# Patient Record
Sex: MALE | Race: WHITE | NOT HISPANIC OR LATINO | URBAN - METROPOLITAN AREA
[De-identification: names, ages, dates, MRNs, and addresses within clinical notes are randomized per-mention and may not be internally consistent; named-entity substitution may affect disease eponyms.]

---

## 2017-01-13 ENCOUNTER — APPOINTMENT (RX ONLY)
Dept: URBAN - METROPOLITAN AREA CLINIC 343 | Facility: CLINIC | Age: 77
Setting detail: DERMATOLOGY
End: 2017-01-13

## 2017-01-13 DIAGNOSIS — L57.0 ACTINIC KERATOSIS: ICD-10-CM

## 2017-01-13 PROBLEM — C44.42 SQUAMOUS CELL CARCINOMA OF SKIN OF SCALP AND NECK: Status: ACTIVE | Noted: 2017-01-13

## 2017-01-13 PROCEDURE — ? OTHER

## 2017-01-13 PROCEDURE — ? PRESCRIPTION

## 2017-01-13 PROCEDURE — ? TREATMENT REGIMEN

## 2017-01-13 PROCEDURE — 99212 OFFICE O/P EST SF 10 MIN: CPT

## 2017-01-13 PROCEDURE — ? COUNSELING

## 2017-01-13 RX ORDER — IMIQUIMOD 37.5 MG/G
1 CREAM TOPICAL QD
Qty: 1 | Refills: 0 | Status: ERX | COMMUNITY
Start: 2017-01-13

## 2017-01-13 RX ADMIN — IMIQUIMOD 1: 37.5 CREAM TOPICAL at 00:00

## 2017-01-13 ASSESSMENT — LOCATION ZONE DERM: LOCATION ZONE: SCALP

## 2017-01-13 ASSESSMENT — LOCATION DETAILED DESCRIPTION DERM: LOCATION DETAILED: POSTERIOR MID-PARIETAL SCALP

## 2017-01-13 ASSESSMENT — LOCATION SIMPLE DESCRIPTION DERM: LOCATION SIMPLE: POSTERIOR SCALP

## 2017-01-13 NOTE — PROCEDURE: OTHER
Note Text (......Xxx Chief Complaint.): This diagnosis correlates with the
Other (Free Text): Due to size of cancer and ill defined borders it was discussed treating the lesion with Imiquimod cream.
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 Will have him treat the entire top portion of scalp into the hairline.
Detail Level: Zone
Detail Level: Detailed

## 2017-01-13 NOTE — PROCEDURE: TREATMENT REGIMEN
Plan: Will recheck in 2 weeks. Patient is aware we plan on using this cream for 4 weeks total to try and shrink the cancer and then discuss removal via surgery.
Initiate Treatment: Will send in RX Imiquimod cream, apply QD mon- friday to top of scalp, encompassing the actinic damage as well.
Detail Level: Simple

## 2017-01-27 ENCOUNTER — APPOINTMENT (RX ONLY)
Dept: URBAN - METROPOLITAN AREA CLINIC 343 | Facility: CLINIC | Age: 77
Setting detail: DERMATOLOGY
End: 2017-01-27

## 2017-01-27 DIAGNOSIS — L57.0 ACTINIC KERATOSIS: ICD-10-CM

## 2017-01-27 PROBLEM — C44.42 SQUAMOUS CELL CARCINOMA OF SKIN OF SCALP AND NECK: Status: ACTIVE | Noted: 2017-01-27

## 2017-01-27 PROCEDURE — 99213 OFFICE O/P EST LOW 20 MIN: CPT

## 2017-01-27 PROCEDURE — ? TREATMENT REGIMEN

## 2017-01-27 ASSESSMENT — LOCATION SIMPLE DESCRIPTION DERM: LOCATION SIMPLE: SCALP

## 2017-01-27 ASSESSMENT — LOCATION ZONE DERM: LOCATION ZONE: SCALP

## 2017-01-27 ASSESSMENT — LOCATION DETAILED DESCRIPTION DERM: LOCATION DETAILED: LEFT SUPERIOR PARIETAL SCALP

## 2017-01-27 NOTE — PROCEDURE: TREATMENT REGIMEN
Plan: The patient is to call the office if any complications arise for further evaluation and management. The patient is to recheck in 2 weeks
Detail Level: Simple
Continue Regimen: Imiquimod 3.75% topical cream to the top of the scalp once a day Mon. thru Fri. for 2 more weeks then discontinue (the patient states he has enough medication at home, no refill needed)
Continue Regimen: Imiquimod 3.75% topical cream once a day Mon. thru Fri. to the top of the scalp for 2 more weeks then discontinue (the patient states he has enough medication at home, no refill needed)
Plan: The patient is to call the office if any complications arise for further evaluation and management. He is to recheck the scalp in 2 weeks

## 2017-02-10 ENCOUNTER — APPOINTMENT (RX ONLY)
Dept: URBAN - METROPOLITAN AREA CLINIC 343 | Facility: CLINIC | Age: 77
Setting detail: DERMATOLOGY
End: 2017-02-10

## 2017-02-10 DIAGNOSIS — L57.0 ACTINIC KERATOSIS: ICD-10-CM

## 2017-02-10 PROBLEM — C44.42 SQUAMOUS CELL CARCINOMA OF SKIN OF SCALP AND NECK: Status: ACTIVE | Noted: 2017-02-10

## 2017-02-10 PROBLEM — Z85.828 PERSONAL HISTORY OF OTHER MALIGNANT NEOPLASM OF SKIN: Status: ACTIVE | Noted: 2017-02-10

## 2017-02-10 PROBLEM — E78.5 HYPERLIPIDEMIA, UNSPECIFIED: Status: ACTIVE | Noted: 2017-02-10

## 2017-02-10 PROCEDURE — ? TREATMENT REGIMEN

## 2017-02-10 PROCEDURE — ? PRESCRIPTION

## 2017-02-10 PROCEDURE — 99212 OFFICE O/P EST SF 10 MIN: CPT

## 2017-02-10 RX ORDER — MUPIROCIN 20 MG/G
1 OINTMENT TOPICAL BID
Qty: 1 | Refills: 2 | Status: ERX | COMMUNITY
Start: 2017-02-10

## 2017-02-10 RX ADMIN — MUPIROCIN 1: 20 OINTMENT TOPICAL at 00:00

## 2017-02-10 ASSESSMENT — LOCATION SIMPLE DESCRIPTION DERM: LOCATION SIMPLE: SCALP

## 2017-02-10 ASSESSMENT — LOCATION ZONE DERM: LOCATION ZONE: SCALP

## 2017-02-10 ASSESSMENT — LOCATION DETAILED DESCRIPTION DERM: LOCATION DETAILED: LEFT SUPERIOR PARIETAL SCALP

## 2017-02-10 NOTE — PROCEDURE: TREATMENT REGIMEN
Discontinue Regimen: Patient to discontinue the imiquimod cream at this time.
Detail Level: Simple
Discontinue Regimen: Patient did a great job treating the actinic damage in the top of his scalp. He is to discontinue the treatment at this time. Will follow up inn2 weeks.
Initiate Treatment: Will send in rx mupirocin ointment to start applying bid to top of scalp.
Plan: Patient to apply aquaphor on scalp as well. Will recheck in 2 weeks.
Detail Level: Zone

## 2017-02-23 ENCOUNTER — APPOINTMENT (RX ONLY)
Dept: URBAN - METROPOLITAN AREA CLINIC 343 | Facility: CLINIC | Age: 77
Setting detail: DERMATOLOGY
End: 2017-02-23

## 2017-02-23 PROBLEM — C44.42 SQUAMOUS CELL CARCINOMA OF SKIN OF SCALP AND NECK: Status: ACTIVE | Noted: 2017-02-23

## 2017-02-23 PROCEDURE — 99212 OFFICE O/P EST SF 10 MIN: CPT

## 2017-02-23 PROCEDURE — ? TREATMENT REGIMEN

## 2017-02-23 NOTE — PROCEDURE: TREATMENT REGIMEN
Detail Level: Zone
Plan: Patient is to continue mupirocin 2% ointment. He is to add Aquaphor and applying multiple times a day to scalp. The patient is not ready to pursue treatment at this time due to the crusting still present. We will continue to moisturize the area and attempt to debride the crusting that is present s/p imiquimod. We will recheck in three weeks. Patient is to contact the office with any problems

## 2017-03-17 ENCOUNTER — APPOINTMENT (RX ONLY)
Dept: URBAN - METROPOLITAN AREA CLINIC 343 | Facility: CLINIC | Age: 77
Setting detail: DERMATOLOGY
End: 2017-03-17

## 2017-03-17 PROBLEM — I10 ESSENTIAL (PRIMARY) HYPERTENSION: Status: ACTIVE | Noted: 2017-03-17

## 2017-03-17 PROBLEM — C44.42 SQUAMOUS CELL CARCINOMA OF SKIN OF SCALP AND NECK: Status: ACTIVE | Noted: 2017-03-17

## 2017-03-17 PROCEDURE — 99212 OFFICE O/P EST SF 10 MIN: CPT

## 2017-03-17 PROCEDURE — ? TREATMENT REGIMEN

## 2017-03-17 NOTE — PROCEDURE: TREATMENT REGIMEN
Detail Level: Simple
Plan: Discussed with the patient that the crusting has resolved s/p reaction from imiquimod treatment on the scalp. At this time, it is difficult to exactly pin-point the location of the previous biopsy. Discussed with the patient we would like to go ahead and monitor the area. The patient is agreeable to this. He is aware a biopsy would be the definitive step to see if any skin cancer is present. He is aware we would biopsy the area first before surgery. The patient is agreeable to monitoring the area at this time. We will recheck in one month. Patient is to contact the office with any problems. Pictures taken today of area suspected the biopsy was previously taken.

## 2017-04-14 ENCOUNTER — APPOINTMENT (RX ONLY)
Dept: URBAN - METROPOLITAN AREA CLINIC 343 | Facility: CLINIC | Age: 77
Setting detail: DERMATOLOGY
End: 2017-04-14

## 2017-04-14 PROBLEM — Z85.46 PERSONAL HISTORY OF MALIGNANT NEOPLASM OF PROSTATE: Status: ACTIVE | Noted: 2017-04-14

## 2017-04-14 PROBLEM — C44.42 SQUAMOUS CELL CARCINOMA OF SKIN OF SCALP AND NECK: Status: ACTIVE | Noted: 2017-04-14

## 2017-04-14 PROCEDURE — ? TREATMENT REGIMEN

## 2017-04-14 PROCEDURE — 99212 OFFICE O/P EST SF 10 MIN: CPT

## 2017-04-14 NOTE — PROCEDURE: TREATMENT REGIMEN
Detail Level: Simple
Plan: Discussed with the patient that there is no evidence present of the Superficially Invasive Squamous Cell Carcinoma s/p [place]. At this time, it is difficult to exactly pin-point the location of the previous biopsy. Discussed with the patient\\nwe would like to go ahead and monitor the area. The patient is agreeable to this. [place] and will have his Dermatologist there monitor the scalp as well.  \\nWe will recheck in the fall once the [place].Patient is to contact the office with any problems.

## 2020-12-22 ENCOUNTER — APPOINTMENT (RX ONLY)
Dept: URBAN - METROPOLITAN AREA CLINIC 153 | Facility: CLINIC | Age: 80
Setting detail: DERMATOLOGY
End: 2020-12-22

## 2020-12-22 DIAGNOSIS — L21.8 OTHER SEBORRHEIC DERMATITIS: ICD-10-CM

## 2020-12-22 DIAGNOSIS — L57.0 ACTINIC KERATOSIS: ICD-10-CM

## 2020-12-22 PROBLEM — D48.5 NEOPLASM OF UNCERTAIN BEHAVIOR OF SKIN: Status: ACTIVE | Noted: 2020-12-22

## 2020-12-22 PROBLEM — L30.9 DERMATITIS, UNSPECIFIED: Status: ACTIVE | Noted: 2020-12-22

## 2020-12-22 PROCEDURE — 99202 OFFICE O/P NEW SF 15 MIN: CPT | Mod: 25

## 2020-12-22 PROCEDURE — 17000 DESTRUCT PREMALG LESION: CPT | Mod: 59

## 2020-12-22 PROCEDURE — ? PRESCRIPTION

## 2020-12-22 PROCEDURE — ? TREATMENT REGIMEN

## 2020-12-22 PROCEDURE — ? LIQUID NITROGEN

## 2020-12-22 PROCEDURE — 11102 TANGNTL BX SKIN SINGLE LES: CPT

## 2020-12-22 PROCEDURE — 11103 TANGNTL BX SKIN EA SEP/ADDL: CPT

## 2020-12-22 PROCEDURE — 17003 DESTRUCT PREMALG LES 2-14: CPT

## 2020-12-22 PROCEDURE — ? BIOPSY BY SHAVE METHOD

## 2020-12-22 RX ORDER — HYDROCORTISONE 25 MG/ML
1 LOTION TOPICAL BID
Qty: 1 | Refills: 1 | Status: ERX | COMMUNITY
Start: 2020-12-22

## 2020-12-22 RX ADMIN — HYDROCORTISONE 1: 25 LOTION TOPICAL at 00:00

## 2020-12-22 ASSESSMENT — LOCATION ZONE DERM
LOCATION ZONE: FACE
LOCATION ZONE: SCALP
LOCATION ZONE: NECK
LOCATION ZONE: EAR

## 2020-12-22 ASSESSMENT — LOCATION DETAILED DESCRIPTION DERM
LOCATION DETAILED: LEFT LATERAL ZYGOMA
LOCATION DETAILED: LEFT SUPERIOR HELIX
LOCATION DETAILED: LEFT INFERIOR LATERAL MALAR CHEEK
LOCATION DETAILED: LEFT SUPERIOR LATERAL MALAR CHEEK
LOCATION DETAILED: LEFT SUPERIOR LATERAL BUCCAL CHEEK
LOCATION DETAILED: LEFT INFERIOR POSTAURICULAR SKIN
LOCATION DETAILED: LEFT INFERIOR PREAURICULAR CHEEK
LOCATION DETAILED: LEFT SUPERIOR LATERAL NECK
LOCATION DETAILED: LEFT ANTIHELIX
LOCATION DETAILED: RIGHT SUPERIOR PARIETAL SCALP
LOCATION DETAILED: RIGHT SUPERIOR CRUS OF ANTIHELIX
LOCATION DETAILED: RIGHT SUPERIOR LATERAL NECK

## 2020-12-22 ASSESSMENT — LOCATION SIMPLE DESCRIPTION DERM
LOCATION SIMPLE: SCALP
LOCATION SIMPLE: RIGHT ANTERIOR NECK
LOCATION SIMPLE: LEFT EAR
LOCATION SIMPLE: RIGHT EAR
LOCATION SIMPLE: LEFT ZYGOMA
LOCATION SIMPLE: LEFT ANTERIOR NECK
LOCATION SIMPLE: LEFT CHEEK

## 2020-12-22 NOTE — PROCEDURE: TREATMENT REGIMEN
Detail Level: Zone
Initiate Treatment: hydrocortisone 2.5 % lotion TP Dose: 1 Frequency: BID Sig: Apply to the scalp, face, neck, and ears twice a day for 2 weeks until follow up appointment
Plan: Patient to contact the office if any problems occur for further evaluation and management, will recheck in two weeks

## 2021-01-06 ENCOUNTER — APPOINTMENT (RX ONLY)
Dept: URBAN - METROPOLITAN AREA CLINIC 153 | Facility: CLINIC | Age: 81
Setting detail: DERMATOLOGY
End: 2021-01-06

## 2021-01-06 DIAGNOSIS — L21.8 OTHER SEBORRHEIC DERMATITIS: ICD-10-CM

## 2021-01-06 PROBLEM — L30.9 DERMATITIS, UNSPECIFIED: Status: ACTIVE | Noted: 2021-01-06

## 2021-01-06 PROBLEM — C44.42 SQUAMOUS CELL CARCINOMA OF SKIN OF SCALP AND NECK: Status: ACTIVE | Noted: 2021-01-06

## 2021-01-06 PROBLEM — D04.4 CARCINOMA IN SITU OF SKIN OF SCALP AND NECK: Status: ACTIVE | Noted: 2021-01-06

## 2021-01-06 PROCEDURE — ? CONSULTATION FOR MOHS SURGERY

## 2021-01-06 PROCEDURE — ? COUNSELING

## 2021-01-06 PROCEDURE — 99213 OFFICE O/P EST LOW 20 MIN: CPT

## 2021-01-06 PROCEDURE — ? TREATMENT REGIMEN

## 2021-01-06 ASSESSMENT — LOCATION SIMPLE DESCRIPTION DERM
LOCATION SIMPLE: FRONTAL SCALP
LOCATION SIMPLE: LEFT FOREHEAD
LOCATION SIMPLE: RIGHT FOREHEAD
LOCATION SIMPLE: LEFT SCALP
LOCATION SIMPLE: RIGHT SCALP

## 2021-01-06 ASSESSMENT — LOCATION DETAILED DESCRIPTION DERM
LOCATION DETAILED: MEDIAL FRONTAL SCALP
LOCATION DETAILED: RIGHT CENTRAL FRONTAL SCALP
LOCATION DETAILED: LEFT SUPERIOR MEDIAL FOREHEAD
LOCATION DETAILED: LEFT CENTRAL FRONTAL SCALP
LOCATION DETAILED: RIGHT SUPERIOR FOREHEAD

## 2021-01-06 ASSESSMENT — LOCATION ZONE DERM
LOCATION ZONE: FACE
LOCATION ZONE: SCALP

## 2021-01-06 NOTE — PROCEDURE: TREATMENT REGIMEN
Plan: Patient to contact the office if any problems occur for further evaluation and management, will recheck in two
Detail Level: Simple
Continue Regimen: Apply hydrocortisone 2.5 % lotion to the frontal scalp and top of the forehead  twice a day for 2 weeks until follow up appointment.

## 2021-01-06 NOTE — PROCEDURE: CONSULTATION FOR MOHS SURGERY
Detail Level: Detailed
Size Of Lesion: 1.1
Incorporate Mauc In Note: Yes
Body Location Override (Optional - Billing Will Still Be Based On Selected Body Map Location If Applicable): Medial frontal scalp
Size Of Lesion: 0.9

## 2021-01-21 ENCOUNTER — APPOINTMENT (RX ONLY)
Dept: URBAN - METROPOLITAN AREA CLINIC 153 | Facility: CLINIC | Age: 81
Setting detail: DERMATOLOGY
End: 2021-01-21

## 2021-01-21 DIAGNOSIS — L57.0 ACTINIC KERATOSIS: ICD-10-CM

## 2021-01-21 DIAGNOSIS — L21.8 OTHER SEBORRHEIC DERMATITIS: ICD-10-CM | Status: RESOLVED

## 2021-01-21 DIAGNOSIS — Z71.89 OTHER SPECIFIED COUNSELING: ICD-10-CM

## 2021-01-21 PROBLEM — C44.42 SQUAMOUS CELL CARCINOMA OF SKIN OF SCALP AND NECK: Status: ACTIVE | Noted: 2021-01-21

## 2021-01-21 PROBLEM — L30.9 DERMATITIS, UNSPECIFIED: Status: ACTIVE | Noted: 2021-01-21

## 2021-01-21 PROBLEM — D04.4 CARCINOMA IN SITU OF SKIN OF SCALP AND NECK: Status: ACTIVE | Noted: 2021-01-21

## 2021-01-21 PROCEDURE — ? PRESCRIPTION

## 2021-01-21 PROCEDURE — ? COUNSELING

## 2021-01-21 PROCEDURE — 99213 OFFICE O/P EST LOW 20 MIN: CPT

## 2021-01-21 PROCEDURE — ? TREATMENT REGIMEN

## 2021-01-21 RX ORDER — FLUOROURACIL 5 MG/G
1 CREAM TOPICAL BID
Qty: 1 | Refills: 0 | Status: ERX | COMMUNITY
Start: 2021-01-21

## 2021-01-21 RX ORDER — FLUOROURACIL 5 MG/G
CREAM TOPICAL BID
Qty: 1 | Refills: 0 | Status: ERX

## 2021-01-21 RX ADMIN — FLUOROURACIL 1: 5 CREAM TOPICAL at 00:00

## 2021-01-21 ASSESSMENT — LOCATION ZONE DERM
LOCATION ZONE: SCALP
LOCATION ZONE: FACE

## 2021-01-21 ASSESSMENT — LOCATION DETAILED DESCRIPTION DERM
LOCATION DETAILED: RIGHT CENTRAL TEMPLE
LOCATION DETAILED: LEFT FOREHEAD
LOCATION DETAILED: LEFT CENTRAL TEMPLE
LOCATION DETAILED: RIGHT FOREHEAD
LOCATION DETAILED: RIGHT SUPERIOR FOREHEAD
LOCATION DETAILED: SUPERIOR MID FOREHEAD
LOCATION DETAILED: LEFT LATERAL FRONTAL SCALP
LOCATION DETAILED: LEFT SUPERIOR MEDIAL FOREHEAD
LOCATION DETAILED: MEDIAL FRONTAL SCALP
LOCATION DETAILED: LEFT CENTRAL FRONTAL SCALP
LOCATION DETAILED: MID-FRONTAL SCALP
LOCATION DETAILED: RIGHT CENTRAL FRONTAL SCALP

## 2021-01-21 ASSESSMENT — LOCATION SIMPLE DESCRIPTION DERM
LOCATION SIMPLE: LEFT FOREHEAD
LOCATION SIMPLE: LEFT TEMPLE
LOCATION SIMPLE: RIGHT SCALP
LOCATION SIMPLE: LEFT SCALP
LOCATION SIMPLE: FRONTAL SCALP
LOCATION SIMPLE: RIGHT FOREHEAD
LOCATION SIMPLE: RIGHT TEMPLE
LOCATION SIMPLE: ANTERIOR SCALP
LOCATION SIMPLE: SUPERIOR FOREHEAD

## 2021-01-21 NOTE — PROCEDURE: TREATMENT REGIMEN
Plan to discontinue hydrocortisone 2.5%. There is no rash present. The rash has resolved. He is aware to moisturize on a daily basis with Aquaphor or Vaseline and to wear sunscreen on a daily basis. He is aware to contact the office if he has any questions or concerns.
Detail Level: Simple
Detail Level: Detailed
Plan to clear up the actinic damage on the frontal scalp surrounding the skin cancer first. 5FU 5% was prescribed today to apply twice daily to the frontal scalp including the biopsy sites.  Plan to see patient back in 11 days to recheck.  He is aware to contact the office if he has any questions or concerns.

## 2021-02-01 ENCOUNTER — APPOINTMENT (RX ONLY)
Dept: URBAN - METROPOLITAN AREA CLINIC 153 | Facility: CLINIC | Age: 81
Setting detail: DERMATOLOGY
End: 2021-02-01

## 2021-02-01 DIAGNOSIS — Z71.89 OTHER SPECIFIED COUNSELING: ICD-10-CM

## 2021-02-01 DIAGNOSIS — L57.0 ACTINIC KERATOSIS: ICD-10-CM

## 2021-02-01 PROBLEM — D04.4 CARCINOMA IN SITU OF SKIN OF SCALP AND NECK: Status: ACTIVE | Noted: 2021-02-01

## 2021-02-01 PROBLEM — C44.42 SQUAMOUS CELL CARCINOMA OF SKIN OF SCALP AND NECK: Status: ACTIVE | Noted: 2021-02-01

## 2021-02-01 PROCEDURE — ? COUNSELING

## 2021-02-01 PROCEDURE — 99213 OFFICE O/P EST LOW 20 MIN: CPT

## 2021-02-01 PROCEDURE — ? TREATMENT REGIMEN

## 2021-02-01 ASSESSMENT — LOCATION SIMPLE DESCRIPTION DERM
LOCATION SIMPLE: LEFT FOREHEAD
LOCATION SIMPLE: RIGHT FOREHEAD
LOCATION SIMPLE: LEFT CHEEK
LOCATION SIMPLE: LEFT FOREARM
LOCATION SIMPLE: RIGHT TEMPLE
LOCATION SIMPLE: LEFT SCALP
LOCATION SIMPLE: RIGHT FOREARM

## 2021-02-01 ASSESSMENT — LOCATION DETAILED DESCRIPTION DERM
LOCATION DETAILED: LEFT MEDIAL FRONTAL SCALP
LOCATION DETAILED: LEFT SUPERIOR LATERAL MALAR CHEEK
LOCATION DETAILED: RIGHT INFERIOR TEMPLE
LOCATION DETAILED: RIGHT DISTAL DORSAL FOREARM
LOCATION DETAILED: RIGHT INFERIOR LATERAL FOREHEAD
LOCATION DETAILED: LEFT PROXIMAL DORSAL FOREARM
LOCATION DETAILED: LEFT FOREHEAD

## 2021-02-01 ASSESSMENT — LOCATION ZONE DERM
LOCATION ZONE: ARM
LOCATION ZONE: SCALP
LOCATION ZONE: FACE

## 2021-02-01 NOTE — PROCEDURE: TREATMENT REGIMEN
Detail Level: Zone
Detail Level: Detailed
Plan to clear up the actinic damage on the left medial frontal scalp surrounding the skin cancer first. Will continue with 5FU 5% cream for three more days then discontinue. Plan to see patient back in 4 weeks for further evaluation. He is aware to contact the office if he has any questions or concerns.
Plan to clear up the actinic damage on the frontal scalp surrounding the skin cancer first. Will continue with 5FU 5% cream for three more days then discontinue. Plan to see patient back in 4 weeks for further evaluation. He is aware to contact the office if he has any questions or concerns.

## 2021-03-01 ENCOUNTER — APPOINTMENT (RX ONLY)
Dept: URBAN - METROPOLITAN AREA CLINIC 153 | Facility: CLINIC | Age: 81
Setting detail: DERMATOLOGY
End: 2021-03-01

## 2021-03-01 DIAGNOSIS — L57.0 ACTINIC KERATOSIS: ICD-10-CM

## 2021-03-01 DIAGNOSIS — Z71.89 OTHER SPECIFIED COUNSELING: ICD-10-CM

## 2021-03-01 PROBLEM — C44.42 SQUAMOUS CELL CARCINOMA OF SKIN OF SCALP AND NECK: Status: ACTIVE | Noted: 2021-03-01

## 2021-03-01 PROBLEM — D04.4 CARCINOMA IN SITU OF SKIN OF SCALP AND NECK: Status: ACTIVE | Noted: 2021-03-01

## 2021-03-01 PROCEDURE — 99213 OFFICE O/P EST LOW 20 MIN: CPT

## 2021-03-01 PROCEDURE — ? TREATMENT REGIMEN

## 2021-03-01 PROCEDURE — ? COUNSELING

## 2021-03-01 ASSESSMENT — LOCATION SIMPLE DESCRIPTION DERM
LOCATION SIMPLE: LEFT FOREARM
LOCATION SIMPLE: LEFT TEMPLE
LOCATION SIMPLE: RIGHT TEMPLE
LOCATION SIMPLE: RIGHT FOREARM
LOCATION SIMPLE: RIGHT FOREHEAD
LOCATION SIMPLE: LEFT FOREHEAD
LOCATION SIMPLE: POSTERIOR SCALP

## 2021-03-01 ASSESSMENT — LOCATION DETAILED DESCRIPTION DERM
LOCATION DETAILED: RIGHT INFERIOR TEMPLE
LOCATION DETAILED: LEFT FOREHEAD
LOCATION DETAILED: POSTERIOR MID-PARIETAL SCALP
LOCATION DETAILED: RIGHT DISTAL RADIAL DORSAL FOREARM
LOCATION DETAILED: RIGHT FOREHEAD
LOCATION DETAILED: LEFT INFERIOR TEMPLE
LOCATION DETAILED: LEFT DISTAL DORSAL FOREARM

## 2021-03-01 ASSESSMENT — LOCATION ZONE DERM
LOCATION ZONE: ARM
LOCATION ZONE: SCALP
LOCATION ZONE: FACE

## 2021-03-01 NOTE — PROCEDURE: TREATMENT REGIMEN
Detail Level: Simple
Detail Level: Zone
Plan to clear up the actinic damage on the left medial frontal scalp surrounding the skin cancer first. Will continue with 5FU 5% cream for two weeks then discontinue. Plan to see patient back in 2 weeks for further evaluation. Discussed with patient we could biopsy to see where we stand with lesion, we could apply 5FU cream again, we could try blue light therapy, or monitor site. Recommended patient have Mohs surgery at this time and patient declines. He is aware to contact the office if he has any questions or concerns.
Plan: Discussed with the patient the previously biopsied area is still identifiable, though much smaller than the original biopsy. Discussed we recommend Mohs for definitive treatment. Patient would like to avoid surgery. Discussed re-biopsying the lesion to see if there is a skin cancer still present or continuing 5FU for a total of 4 more weeks to complete 6 weeks of treatment in the area and then re-evaluating the area. The patient would like to proceed with continuing 5FU.  He will apply 5FU 5% cream BID to the site for two weeks and we will recheck at that time. He is aware that skin cancers, if left un-treated, are normally locally destructive and can cause tissue damage and/or infection. Rarely, a skin cancer can spread into the body and cause systemic illness and/or death. Patient verbalized his understanding of this and is agreeable to treatment still.

## 2021-03-15 ENCOUNTER — APPOINTMENT (RX ONLY)
Dept: URBAN - METROPOLITAN AREA CLINIC 153 | Facility: CLINIC | Age: 81
Setting detail: DERMATOLOGY
End: 2021-03-15

## 2021-03-15 DIAGNOSIS — L57.0 ACTINIC KERATOSIS: ICD-10-CM

## 2021-03-15 PROBLEM — D04.4 CARCINOMA IN SITU OF SKIN OF SCALP AND NECK: Status: ACTIVE | Noted: 2021-03-15

## 2021-03-15 PROBLEM — C44.42 SQUAMOUS CELL CARCINOMA OF SKIN OF SCALP AND NECK: Status: ACTIVE | Noted: 2021-03-15

## 2021-03-15 PROCEDURE — ? TREATMENT REGIMEN

## 2021-03-15 PROCEDURE — 99213 OFFICE O/P EST LOW 20 MIN: CPT

## 2021-03-15 ASSESSMENT — LOCATION ZONE DERM
LOCATION ZONE: HAND
LOCATION ZONE: ARM

## 2021-03-15 ASSESSMENT — LOCATION SIMPLE DESCRIPTION DERM
LOCATION SIMPLE: LEFT HAND
LOCATION SIMPLE: LEFT FOREARM
LOCATION SIMPLE: RIGHT FOREARM
LOCATION SIMPLE: RIGHT HAND

## 2021-03-15 ASSESSMENT — LOCATION DETAILED DESCRIPTION DERM
LOCATION DETAILED: RIGHT ULNAR DORSAL HAND
LOCATION DETAILED: LEFT ULNAR DORSAL HAND
LOCATION DETAILED: LEFT DISTAL DORSAL FOREARM
LOCATION DETAILED: RIGHT DISTAL DORSAL FOREARM

## 2021-03-15 NOTE — PROCEDURE: TREATMENT REGIMEN
Discontinue Regimen: Patient to discontinue applying 5Fu to arms and hands
Plan: Patient to contact the office if any problems occur for further evaluation and management.
Initiate Treatment: patient to apply Vaseline to arms and hands to help moisturize.
Detail Level: Zone
Continue Regimen: Patient to continue applying 5FU for two more weeks to scalp
Detail Level: Detailed
Plan: Patient to return to the office in two weeks to recheck scalp. Patient to contact the office if any problems occur for further evaluation and management.
Continue Regimen: Patient to continue applying 5FU bid for two more weeks to scalp.

## 2021-03-30 ENCOUNTER — APPOINTMENT (RX ONLY)
Dept: URBAN - METROPOLITAN AREA CLINIC 153 | Facility: CLINIC | Age: 81
Setting detail: DERMATOLOGY
End: 2021-03-30

## 2021-03-30 PROBLEM — C44.42 SQUAMOUS CELL CARCINOMA OF SKIN OF SCALP AND NECK: Status: ACTIVE | Noted: 2021-03-30

## 2021-03-30 PROBLEM — D04.4 CARCINOMA IN SITU OF SKIN OF SCALP AND NECK: Status: ACTIVE | Noted: 2021-03-30

## 2021-03-30 PROCEDURE — ? TREATMENT REGIMEN

## 2021-03-30 PROCEDURE — 99213 OFFICE O/P EST LOW 20 MIN: CPT

## 2021-03-30 NOTE — PROCEDURE: TREATMENT REGIMEN
Plan: Patient to discontinue 5FU 5% cream at this time to scalp. Patient is having a good reaction. He has finished 6 weeks of treatment with 5FU at this time.  He is aware if this skin cancer is inadequately treated the lesion can continue to grow, cause local tissue destruction and/or systemic illness/infection and death. Patient will be returning to [place] in two weeks weeks. I recommended he follow up with his dermatologist there. Will fax our records to Dr. George Díaz at [facility]. Patient is aware to contact the office if he has any questions, problems or concerns. Plan: Patient to discontinue 5FU 5% cream at this time to scalp. Patient is having a good reaction. He has finished 6 weeks of treatment with 5FU at this time.  He is aware if this skin cancer is inadequately treated the lesion can continue to grow, cause local tissue destruction and/or systemic illness/infection and death. Patient will be returning to [place] in two weeks weeks. I recommended he follow up with his dermatologist there. Will fax our records to Dr. George Díaz at [facility]. Patient is aware to contact the office if he has any questions, problems or concerns.

## 2021-03-30 NOTE — PROCEDURE: TREATMENT REGIMEN
Plan: Patient to discontinue 5FU 5% cream at this time to scalp. Patient is having a good reaction. He has completed 6 weeks of therapy of 5FU to the area. Patient will be returning to [place] in two weeks. I recommended he follow up with his dermatologist there and address the lesion. He is aware if this skin cancer is inadequately treated the lesion can continue to grow, cause local tissue destruction and/or systemic illness/infection and death. Will fax our records to Dr. George Díaz at [facility]. Patient is aware to contact the office if he has any questions, problems or concerns